# Patient Record
Sex: FEMALE | Race: WHITE | NOT HISPANIC OR LATINO | URBAN - METROPOLITAN AREA
[De-identification: names, ages, dates, MRNs, and addresses within clinical notes are randomized per-mention and may not be internally consistent; named-entity substitution may affect disease eponyms.]

---

## 2017-01-01 ENCOUNTER — INPATIENT (INPATIENT)
Facility: HOSPITAL | Age: 77
LOS: 0 days | DRG: 66 | End: 2017-09-15
Attending: SURGERY | Admitting: STUDENT IN AN ORGANIZED HEALTH CARE EDUCATION/TRAINING PROGRAM
Payer: MEDICARE

## 2017-01-01 VITALS
HEART RATE: 105 BPM | DIASTOLIC BLOOD PRESSURE: 91 MMHG | WEIGHT: 134.92 LBS | RESPIRATION RATE: 30 BRPM | SYSTOLIC BLOOD PRESSURE: 145 MMHG | HEIGHT: 64 IN | OXYGEN SATURATION: 99 %

## 2017-01-01 VITALS
DIASTOLIC BLOOD PRESSURE: 110 MMHG | SYSTOLIC BLOOD PRESSURE: 166 MMHG | RESPIRATION RATE: 16 BRPM | HEART RATE: 142 BPM | OXYGEN SATURATION: 100 %

## 2017-01-01 DIAGNOSIS — T14.90 INJURY, UNSPECIFIED: ICD-10-CM

## 2017-01-01 DIAGNOSIS — I62.9 NONTRAUMATIC INTRACRANIAL HEMORRHAGE, UNSPECIFIED: ICD-10-CM

## 2017-01-01 LAB
ALBUMIN SERPL ELPH-MCNC: 3.8 G/DL — SIGNIFICANT CHANGE UP (ref 3.3–5.2)
ALP SERPL-CCNC: 100 U/L — SIGNIFICANT CHANGE UP (ref 40–120)
ALT FLD-CCNC: 16 U/L — SIGNIFICANT CHANGE UP
AMYLASE P1 CFR SERPL: 40 U/L — SIGNIFICANT CHANGE UP (ref 36–128)
ANION GAP SERPL CALC-SCNC: 18 MMOL/L — HIGH (ref 5–17)
APTT BLD: 36.1 SEC — SIGNIFICANT CHANGE UP (ref 27.5–37.4)
AST SERPL-CCNC: 24 U/L — SIGNIFICANT CHANGE UP
BILIRUB SERPL-MCNC: 1 MG/DL — SIGNIFICANT CHANGE UP (ref 0.4–2)
BLD GP AB SCN SERPL QL: SIGNIFICANT CHANGE UP
BUN SERPL-MCNC: 24 MG/DL — HIGH (ref 8–20)
BURR CELLS BLD QL SMEAR: PRESENT — SIGNIFICANT CHANGE UP
CALCIUM SERPL-MCNC: 9.4 MG/DL — SIGNIFICANT CHANGE UP (ref 8.6–10.2)
CHLORIDE SERPL-SCNC: 101 MMOL/L — SIGNIFICANT CHANGE UP (ref 98–107)
CK SERPL-CCNC: 103 U/L — SIGNIFICANT CHANGE UP (ref 25–170)
CO2 SERPL-SCNC: 22 MMOL/L — SIGNIFICANT CHANGE UP (ref 22–29)
CREAT SERPL-MCNC: 0.79 MG/DL — SIGNIFICANT CHANGE UP (ref 0.5–1.3)
ELLIPTOCYTES BLD QL SMEAR: SLIGHT — SIGNIFICANT CHANGE UP
EOSINOPHIL # BLD AUTO: 0 K/UL — SIGNIFICANT CHANGE UP (ref 0–0.5)
ETHANOL SERPL-MCNC: <10 MG/DL — SIGNIFICANT CHANGE UP
GAS PNL BLDA: SIGNIFICANT CHANGE UP
GLUCOSE SERPL-MCNC: 260 MG/DL — HIGH (ref 70–115)
HCT VFR BLD CALC: 36.7 % — LOW (ref 37–47)
HGB BLD-MCNC: 11.5 G/DL — LOW (ref 12–16)
INR BLD: 1.27 RATIO — HIGH (ref 0.88–1.16)
LIDOCAIN IGE QN: 22 U/L — SIGNIFICANT CHANGE UP (ref 22–51)
LYMPHOCYTES # BLD AUTO: 1.6 K/UL — SIGNIFICANT CHANGE UP (ref 1–4.8)
LYMPHOCYTES # BLD AUTO: 7 % — LOW (ref 20–55)
MCHC RBC-ENTMCNC: 29.6 PG — SIGNIFICANT CHANGE UP (ref 27–31)
MCHC RBC-ENTMCNC: 31.3 G/DL — LOW (ref 32–36)
MCV RBC AUTO: 94.3 FL — SIGNIFICANT CHANGE UP (ref 81–99)
MONOCYTES # BLD AUTO: 1.5 K/UL — HIGH (ref 0–0.8)
MONOCYTES NFR BLD AUTO: 6 % — SIGNIFICANT CHANGE UP (ref 3–10)
NEUTROPHILS # BLD AUTO: 21.3 K/UL — HIGH (ref 1.8–8)
NEUTROPHILS NFR BLD AUTO: 83 % — HIGH (ref 37–73)
NEUTS BAND # BLD: 4 % — SIGNIFICANT CHANGE UP (ref 0–8)
OVALOCYTES BLD QL SMEAR: SLIGHT — SIGNIFICANT CHANGE UP
PLAT MORPH BLD: NORMAL — SIGNIFICANT CHANGE UP
PLATELET # BLD AUTO: 279 K/UL — SIGNIFICANT CHANGE UP (ref 150–400)
POIKILOCYTOSIS BLD QL AUTO: SLIGHT — SIGNIFICANT CHANGE UP
POTASSIUM SERPL-MCNC: 4.2 MMOL/L — SIGNIFICANT CHANGE UP (ref 3.5–5.3)
POTASSIUM SERPL-SCNC: 4.2 MMOL/L — SIGNIFICANT CHANGE UP (ref 3.5–5.3)
PROT SERPL-MCNC: 6.8 G/DL — SIGNIFICANT CHANGE UP (ref 6.6–8.7)
PROTHROM AB SERPL-ACNC: 14 SEC — HIGH (ref 9.8–12.7)
RBC # BLD: 3.89 M/UL — LOW (ref 4.4–5.2)
RBC # FLD: 13.4 % — SIGNIFICANT CHANGE UP (ref 11–15.6)
RBC BLD AUTO: ABNORMAL
SCHISTOCYTES BLD QL AUTO: SLIGHT — SIGNIFICANT CHANGE UP
SODIUM SERPL-SCNC: 141 MMOL/L — SIGNIFICANT CHANGE UP (ref 135–145)
TYPE + AB SCN PNL BLD: SIGNIFICANT CHANGE UP
WBC # BLD: 24.7 K/UL — HIGH (ref 4.8–10.8)
WBC # FLD AUTO: 24.7 K/UL — HIGH (ref 4.8–10.8)

## 2017-01-01 PROCEDURE — 70450 CT HEAD/BRAIN W/O DYE: CPT | Mod: 26

## 2017-01-01 PROCEDURE — 74177 CT ABD & PELVIS W/CONTRAST: CPT | Mod: 26

## 2017-01-01 PROCEDURE — 72170 X-RAY EXAM OF PELVIS: CPT | Mod: 26

## 2017-01-01 PROCEDURE — 71010: CPT | Mod: 26,77

## 2017-01-01 PROCEDURE — 72125 CT NECK SPINE W/O DYE: CPT | Mod: 26

## 2017-01-01 PROCEDURE — 99291 CRITICAL CARE FIRST HOUR: CPT

## 2017-01-01 PROCEDURE — 71010: CPT | Mod: 26

## 2017-01-01 PROCEDURE — 71260 CT THORAX DX C+: CPT | Mod: 26

## 2017-01-01 RX ORDER — MORPHINE SULFATE 50 MG/1
4 CAPSULE, EXTENDED RELEASE ORAL ONCE
Qty: 0 | Refills: 0 | Status: DISCONTINUED | OUTPATIENT
Start: 2017-01-01 | End: 2017-01-01

## 2017-01-01 RX ORDER — DEXTROSE 50 % IN WATER 50 %
12.5 SYRINGE (ML) INTRAVENOUS ONCE
Qty: 0 | Refills: 0 | Status: DISCONTINUED | OUTPATIENT
Start: 2017-01-01 | End: 2017-01-01

## 2017-01-01 RX ORDER — DEXTROSE 50 % IN WATER 50 %
25 SYRINGE (ML) INTRAVENOUS ONCE
Qty: 0 | Refills: 0 | Status: DISCONTINUED | OUTPATIENT
Start: 2017-01-01 | End: 2017-01-01

## 2017-01-01 RX ORDER — ROBINUL 0.2 MG/ML
0.2 INJECTION INTRAMUSCULAR; INTRAVENOUS EVERY 4 HOURS
Qty: 0 | Refills: 0 | Status: DISCONTINUED | OUTPATIENT
Start: 2017-01-01 | End: 2017-01-01

## 2017-01-01 RX ORDER — SODIUM CHLORIDE 9 MG/ML
1000 INJECTION INTRAMUSCULAR; INTRAVENOUS; SUBCUTANEOUS
Qty: 0 | Refills: 0 | Status: DISCONTINUED | OUTPATIENT
Start: 2017-01-01 | End: 2017-01-01

## 2017-01-01 RX ORDER — INSULIN LISPRO 100/ML
VIAL (ML) SUBCUTANEOUS EVERY 6 HOURS
Qty: 0 | Refills: 0 | Status: DISCONTINUED | OUTPATIENT
Start: 2017-01-01 | End: 2017-01-01

## 2017-01-01 RX ORDER — PANTOPRAZOLE SODIUM 20 MG/1
40 TABLET, DELAYED RELEASE ORAL DAILY
Qty: 0 | Refills: 0 | Status: DISCONTINUED | OUTPATIENT
Start: 2017-01-01 | End: 2017-01-01

## 2017-01-01 RX ORDER — GLUCAGON INJECTION, SOLUTION 0.5 MG/.1ML
1 INJECTION, SOLUTION SUBCUTANEOUS ONCE
Qty: 0 | Refills: 0 | Status: DISCONTINUED | OUTPATIENT
Start: 2017-01-01 | End: 2017-01-01

## 2017-01-01 RX ORDER — SODIUM CHLORIDE 9 MG/ML
1000 INJECTION, SOLUTION INTRAVENOUS
Qty: 0 | Refills: 0 | Status: DISCONTINUED | OUTPATIENT
Start: 2017-01-01 | End: 2017-01-01

## 2017-01-01 RX ORDER — DEXTROSE 50 % IN WATER 50 %
1 SYRINGE (ML) INTRAVENOUS ONCE
Qty: 0 | Refills: 0 | Status: DISCONTINUED | OUTPATIENT
Start: 2017-01-01 | End: 2017-01-01

## 2017-01-01 RX ORDER — ACETAMINOPHEN 500 MG
1000 TABLET ORAL ONCE
Qty: 0 | Refills: 0 | Status: COMPLETED | OUTPATIENT
Start: 2017-01-01 | End: 2017-01-01

## 2017-01-01 RX ORDER — SODIUM CHLORIDE 9 MG/ML
1000 INJECTION INTRAMUSCULAR; INTRAVENOUS; SUBCUTANEOUS ONCE
Qty: 0 | Refills: 0 | Status: COMPLETED | OUTPATIENT
Start: 2017-01-01 | End: 2017-01-01

## 2017-01-01 RX ORDER — MORPHINE SULFATE 50 MG/1
2 CAPSULE, EXTENDED RELEASE ORAL
Qty: 250 | Refills: 0 | Status: DISCONTINUED | OUTPATIENT
Start: 2017-01-01 | End: 2017-01-01

## 2017-01-01 RX ORDER — MORPHINE SULFATE 50 MG/1
2 CAPSULE, EXTENDED RELEASE ORAL
Qty: 100 | Refills: 0 | Status: DISCONTINUED | OUTPATIENT
Start: 2017-01-01 | End: 2017-01-01

## 2017-01-01 RX ADMIN — SODIUM CHLORIDE 1000 MILLILITER(S): 9 INJECTION INTRAMUSCULAR; INTRAVENOUS; SUBCUTANEOUS at 14:15

## 2017-01-01 RX ADMIN — PANTOPRAZOLE SODIUM 40 MILLIGRAM(S): 20 TABLET, DELAYED RELEASE ORAL at 15:07

## 2017-01-01 RX ADMIN — MORPHINE SULFATE 4 MILLIGRAM(S): 50 CAPSULE, EXTENDED RELEASE ORAL at 17:17

## 2017-01-01 RX ADMIN — SODIUM CHLORIDE 70 MILLILITER(S): 9 INJECTION INTRAMUSCULAR; INTRAVENOUS; SUBCUTANEOUS at 15:16

## 2017-01-01 RX ADMIN — Medication 400 MILLIGRAM(S): at 15:06

## 2017-01-01 RX ADMIN — ROBINUL 0.2 MILLIGRAM(S): 0.2 INJECTION INTRAMUSCULAR; INTRAVENOUS at 17:16

## 2017-09-15 NOTE — DISCHARGE NOTE FOR THE EXPIRED PATIENT - HOSPITAL COURSE
76 yo F last seen NL some time yesterday evening.  Found down in bathroom JPTA unresponsive.  Intubated in field by EMS with 7.0 tube and no medication given. EMS states pt on ASA and Coumadin for unknown reason.  Family not available and pt unable to give additional HX.    Head CT Revealed catastrophic ICH which appears to be nontraumatic and likely hypertensive in nature.  Neurosurgeon Dr Alexis has evaluated pt and imaging. Feels this is unrecoverable and can not offer surgical or other intervention.  This was told to family by Dr alexis    Pt family opts for DNR and is awaiting further family members for withdrawal of care.    Shortly after son and brother had arrived the decision was made to withdraw medical care and perform terminal wean.    She was given Morphine 4 mg IVP and a morphine drip and glycopyrolate was ordered.   Shortly after this, the patient  prior to any withdrawal of medical care.  An autopsy was offered to family but they did not want one performed.  ME was called and case was denied.    At 17:33 the patient  had no spontaneous respirations, heart rate or pulse and had asytole on monitor.  She was pronounced dead by myself at this time.  Son and Daughter notified at bedside.  Dr Barrientos was also notified by phone. 76 yo F last seen NL some time yesterday evening.  Found down in bathroom JPTA unresponsive.  Intubated in field by EMS with 7.0 tube and no medication given. EMS states pt on ASA and Coumadin for unknown reason.  Family not available and pt unable to give additional HX.    Head CT Revealed catastrophic ICH which appears to be nontraumatic and likely hypertensive in nature.  Neurosurgeon Dr Alexis has evaluated pt and imaging. Feels this is unrecoverable and can not offer surgical or other intervention.  This was told to family by Dr alexis    Pt family opts for DNR and is awaiting further family members for withdrawal of care.    Shortly after son and brother had arrived the decision was made to withdraw medical care and perform terminal wean.    She was given Morphine 4 mg IVP and a morphine drip and glycopyrolate was ordered.   Shortly after this, the patient  prior to any withdrawal of medical care.  An autopsy was offered to family but they did not want one performed.  ME was called and case was denied.    At 17:33 the patient  had no spontaneous respirations, heart rate or pulse and had asytole on monitor.  She was pronounced dead by myself at this time.  Son and Daughter notified at bedside.  Dr Barrientos was also notified by phone.    The ETT was removed by me after pt expiration for family comfort at ~ 5:45PM

## 2017-09-15 NOTE — CHART NOTE - NSCHARTNOTEFT_GEN_A_CORE
Dr Barrientos and I have discussed pt prognosis with son, brother and daughter.  They have elected for withdrawal of all life sustaining efforts and any intervention that may cause discomfort to pt.  They understand that this will result in pt's death and they feel this would be in accordance with the patients own wishes as expressed to them previously.

## 2017-09-15 NOTE — CONSULT NOTE ADULT - SUBJECTIVE AND OBJECTIVE BOX
HISTORY OF PRESENT ILLNESS:   Please note that the history was obtained via chart, family member, and [facility] provider staff.    78 y/o female PMH HTN, HLD, DM2, CAD on ASA and Coumadin, found down in bathroom, unresponsive by daughter earlier this AM. Patient was last seen awake and alert late last night. Patient was intubated in the field without any medication given. BIBEMS to [facility], GCS 3T. CT head showed large left frontal parietal lobe IPH with dissection into lateral ventricles, adjacent thin L sided SDH and mild to moderate SAH.    CTA head and neck unable to obtain as CT C/A/P with contrast was performed already prior to our request    PAST MEDICAL & SURGICAL HISTORY:  HTN, HLD, DM2, CAD    SOCIAL HISTORY: Lives with daughter at home    Allergies Allergy Status Unknown    REVIEW OF SYSTEMS unable to obtain    MEDICATIONS:  Antibiotics:    Neuro:  morphine  Infusion 2 mG/Hr IV Continuous <Continuous>  morphine  Infusion 2 mG/Hr IV Continuous <Continuous>    Anticoagulation:    OTHER:  dextrose Gel 1 Dose(s) Oral once PRN  dextrose 50% Injectable 12.5 Gram(s) IV Push once  dextrose 50% Injectable 25 Gram(s) IV Push once  dextrose 50% Injectable 25 Gram(s) IV Push once  glucagon  Injectable 1 milliGRAM(s) IntraMuscular once PRN  glycopyrrolate Injectable 0.2 milliGRAM(s) IV Push every 4 hours    IVF:  dextrose 5%. 1000 milliLiter(s) IV Continuous <Continuous>      Vital Signs Last 24 Hrs  T(C): 37.2 (15 Sep 2017 16:00), Max: 37.4 (15 Sep 2017 14:00)  T(F): 99 (15 Sep 2017 16:00), Max: 99.3 (15 Sep 2017 14:00)  HR: 142 (15 Sep 2017 17:00) (104 - 160)  BP: 166/110 (15 Sep 2017 17:00) (64/41 - 166/110)  BP(mean): 134 (15 Sep 2017 17:00) (49 - 134)  RR: 16 (15 Sep 2017 17:00) (9 - 30)  SpO2: 100% (15 Sep 2017 17:00) (92% - 100%)    PHYSICAL EXAM:  GENERAL: NAD, well-groomed  HEAD:  Atraumatic, normocephalic  EYES: Conjunctiva and sclera clear  MENTAL STATUS: Intubated, Does not open eyes; not following commands  CRANIAL NERVES: Pupils 4 mm fixed. No corneal reflex. no facial asymmetry; unable to assess sensation. No gag  MOTOR: + withdrawal to noxious all extremities  COORDINATION: unable to assess  SENSATION: unable to assess  CHEST/LUNG: intubated; CTA   HEART: +S1/+S2  ABDOMEN: Soft, nontender, nondistended  EXTREMITIES:  no clubbing, cyanosis, or edema  SKIN: Warm, dry; no rashes or lesions    LABS:                        11.5   24.7  )-----------( 279      ( 15 Sep 2017 11:53 )             36.7     09-15    141  |  101  |  24.0<H>  ----------------------------<  260<H>  4.2   |  22.0  |  0.79    Ca    9.4      15 Sep 2017 11:53    TPro  6.8  /  Alb  3.8  /  TBili  1.0  /  DBili  x   /  AST  24  /  ALT  16  /  AlkPhos  100  09-15    PT/INR - ( 15 Sep 2017 11:53 )   PT: 14.0 sec;   INR: 1.27 ratio         PTT - ( 15 Sep 2017 11:53 )  PTT:36.1 sec    RADIOLOGY & ADDITIONAL STUDIES:  - from: CT Head No Cont (09.15.17 @ 12:25) >  IMPRESSION:      Acute large left frontal parietal lobe intraparenchymal   hemorrhage with dissection into lateral ventricles. Adjacent thin   left-sided subdural hemorrhage and mild to moderate subarachnoid   hemorrhage. Findings discussed with the trauma team at 12:00 PM.    - from: CT Cervical Spine No Cont (09.15.17 @ 12:33)  IMPRESSION:  No acute fracture dislocation involving the cervical spine. Additional   findings above.

## 2017-09-15 NOTE — CONSULT NOTE ADULT - ASSESSMENT
A/P: 76 y/o female PMH HTN, HLD, DM2, CAD on ASA and Coumadin, found down in bathroom. CT head showed large left frontal parietal lobe IPH with dissection into lateral ventricles, adjacent thin L sided SDH and mild to moderate SAH.   - Images and case discussed with Dr. Marr. Hemorrhage is most likely related to a hypertensive bleed. From the imaging and exam of the patient, any surgical intervention would be futile. Dr. Marr and Dr. Barrientos both discussed with patient's family that the state of the patient is unrecoverable  - Recommend palliative care  - Supportive measures per SICU

## 2017-09-15 NOTE — CHART NOTE - NSCHARTNOTEFT_GEN_A_CORE
I have had a conversation with daughter and friend about the pt's status and prognosis.  It had been explained to the family by Dr Alexis of neurosurgery that this injury is "Unrecoverable" and would lead to death.  I have repeated this to the family.  The daughter then stated "She would not want to go on like this".  We are just hoping she can hold on until some other family can make it here".    After further discussion with daughter about previous stated wishes of the patient, it was determined that the pt be DNR with no escalation of care.  Withdrawal of care will be further discussed when other family members arrive. I have had a conversation with daughter and friend about the pt's status and prognosis.  It had been explained to the family by Dr Alexis of neurosurgery that this injury is "Unrecoverable" and would lead to death.  I have repeated this to the family.  The daughter then stated "She would not want to go on like this".  We are just hoping she can hold on until some other family can make it here".    After further discussion with daughter about previous stated wishes of the patient, it was determined that the pt be DNR with no escalation of care.  Withdrawal of care will be further discussed when other family members arrive.    Attending attestation: I also spoke w/ patient's daughter (friend present) and have conveyed the gravity of the intracranial bleed.  Appears to be a massive hypertensive bleed.  I was present during Dr. Maurer's conversation with the daughter and agree that this is a non-survivable bleed.  Have advised daughter to contact family members.  Patient now DNR with no escalation of care.

## 2017-09-15 NOTE — H&P ADULT - ASSESSMENT
78 yo F Found down on ground by family for ~ 15 hour maximum amount of time.  Intubated without medication for unresponsiveness.    HTN tachycardia    Primary : Airway secured.  GCS 3 otherwise intact.    Secondary reveals shortened R LE.  No other SN of trauma.    STAT Head CT reveals catastrophic L hemorrhage including brain stem with large amount of mass effect.    Neurosurgery aware  Will admit to SICU  HOB >30degress.  Avoid IJ Compression.  INR WNL so no K Centra  Will control A-fib with dilt but avoid hypotension or severe HTN.  Get ABG and keep CO2 ~ 35  NPO, OGT  [place] and temp monitoring.  Avoid hypo or hyperthermia  SCD Only.

## 2017-09-15 NOTE — ED PROVIDER NOTE - OBJECTIVE STATEMENT
pt fell at home in bathroom , unresponsive , unknown how long down  facial abrasions   patient boarded collared   trauma team called and acre of patient transferred to trauma

## 2017-12-29 RX ORDER — INSULIN GLARGINE 100 [IU]/ML
27 INJECTION, SOLUTION SUBCUTANEOUS
Qty: 0 | Refills: 0 | COMMUNITY

## 2017-12-29 RX ORDER — FUROSEMIDE 40 MG
1 TABLET ORAL
Qty: 0 | Refills: 0 | COMMUNITY

## 2017-12-29 RX ORDER — WARFARIN SODIUM 2.5 MG/1
0 TABLET ORAL
Qty: 0 | Refills: 0 | COMMUNITY

## 2017-12-29 RX ORDER — CARVEDILOL PHOSPHATE 80 MG/1
1 CAPSULE, EXTENDED RELEASE ORAL
Qty: 0 | Refills: 0 | COMMUNITY

## 2017-12-29 RX ORDER — METFORMIN HYDROCHLORIDE 850 MG/1
1 TABLET ORAL
Qty: 0 | Refills: 0 | COMMUNITY

## 2017-12-29 RX ORDER — ASPIRIN/CALCIUM CARB/MAGNESIUM 324 MG
1 TABLET ORAL
Qty: 0 | Refills: 0 | COMMUNITY

## 2017-12-29 RX ORDER — LOSARTAN POTASSIUM 100 MG/1
1 TABLET, FILM COATED ORAL
Qty: 0 | Refills: 0 | COMMUNITY
